# Patient Record
Sex: FEMALE | Race: WHITE | NOT HISPANIC OR LATINO | URBAN - METROPOLITAN AREA
[De-identification: names, ages, dates, MRNs, and addresses within clinical notes are randomized per-mention and may not be internally consistent; named-entity substitution may affect disease eponyms.]

---

## 2020-10-14 ENCOUNTER — OFFICE VISIT (OUTPATIENT)
Dept: URBAN - METROPOLITAN AREA CLINIC 12 | Facility: CLINIC | Age: 60
End: 2020-10-14

## 2020-10-30 ENCOUNTER — TELEPHONE ENCOUNTER (OUTPATIENT)
Dept: URBAN - METROPOLITAN AREA CLINIC 23 | Facility: CLINIC | Age: 60
End: 2020-10-30

## 2020-11-11 ENCOUNTER — OFFICE VISIT (OUTPATIENT)
Dept: URBAN - METROPOLITAN AREA CLINIC 12 | Facility: CLINIC | Age: 60
End: 2020-11-11
Payer: COMMERCIAL

## 2020-11-11 DIAGNOSIS — K57.92 DIVERTICULITIS: ICD-10-CM

## 2020-11-11 DIAGNOSIS — A04.72 CLOSTRIDIOIDES DIFFICILE DIARRHEA: ICD-10-CM

## 2020-11-11 DIAGNOSIS — K52.9 COLITIS: ICD-10-CM

## 2020-11-11 DIAGNOSIS — Z83.71 FAMILY HISTORY OF POLYPS IN THE COLON: ICD-10-CM

## 2020-11-11 PROBLEM — 64226004 COLITIS: Status: ACTIVE | Noted: 2020-11-11

## 2020-11-11 PROBLEM — 429969003 FAMILY HISTORY OF POLYP OF COLON: Status: ACTIVE | Noted: 2020-11-11

## 2020-11-11 PROCEDURE — 99243 OFF/OP CNSLTJ NEW/EST LOW 30: CPT | Performed by: INTERNAL MEDICINE

## 2020-11-11 PROCEDURE — 99203 OFFICE O/P NEW LOW 30 MIN: CPT | Performed by: INTERNAL MEDICINE

## 2020-11-11 PROCEDURE — G8482 FLU IMMUNIZE ORDER/ADMIN: HCPCS | Performed by: INTERNAL MEDICINE

## 2020-11-11 NOTE — HPI-TODAY'S VISIT:
59 yo female presenting to [facility] care hx of c diff, had colonoscopy in 2017 with ulcerations which raised quetion of crohns but found to have c diff and per Dr. Byrd's notations biopsies showed focal active colitis, thought to be secondary to the c diff.  no recommendation for f/u given.  had hp polyp.  had egd at that time as well -she was in [place] for her daughter's delivery.  she had been having lower abdominal discomfort for a few weeks, ultimately went to er and had ct. told she had diverticulitis and was given abx for 10 days.  she was having loose stools on presentation , was tested for c diff at the hospital.  no blood in the stool.  this cleared up with the abx as well.  -had an episode of severe heartburn a few weeks ago, cleared up with prilosec.  -diet back to normal, good appetite, weight stable. no pain, no bloating. no other complaints now  -she had her flu shot

## 2021-11-17 ENCOUNTER — OFFICE VISIT (OUTPATIENT)
Dept: URBAN - METROPOLITAN AREA CLINIC 78 | Facility: CLINIC | Age: 61
End: 2021-11-17

## 2021-12-22 ENCOUNTER — OFFICE VISIT (OUTPATIENT)
Dept: URBAN - METROPOLITAN AREA CLINIC 78 | Facility: CLINIC | Age: 61
End: 2021-12-22

## 2022-07-01 ENCOUNTER — OFFICE VISIT (OUTPATIENT)
Dept: URBAN - METROPOLITAN AREA CLINIC 78 | Facility: CLINIC | Age: 62
End: 2022-07-01

## 2022-07-18 ENCOUNTER — OFFICE VISIT (OUTPATIENT)
Dept: URBAN - METROPOLITAN AREA CLINIC 78 | Facility: CLINIC | Age: 62
End: 2022-07-18
Payer: COMMERCIAL

## 2022-07-18 VITALS
BODY MASS INDEX: 24.56 KG/M2 | WEIGHT: 138.6 LBS | RESPIRATION RATE: 16 BRPM | DIASTOLIC BLOOD PRESSURE: 75 MMHG | HEIGHT: 63 IN | SYSTOLIC BLOOD PRESSURE: 118 MMHG | HEART RATE: 68 BPM | TEMPERATURE: 98.6 F

## 2022-07-18 DIAGNOSIS — Z79.02 ANTIPLATELET OR ANTITHROMBOTIC LONG-TERM USE: ICD-10-CM

## 2022-07-18 DIAGNOSIS — Z86.010 PERSONAL HISTORY OF COLONIC POLYPS: ICD-10-CM

## 2022-07-18 DIAGNOSIS — K57.90 DIVERTICULOSIS: ICD-10-CM

## 2022-07-18 PROBLEM — 397881000: Status: ACTIVE | Noted: 2022-07-18

## 2022-07-18 PROCEDURE — 99204 OFFICE O/P NEW MOD 45 MIN: CPT | Performed by: INTERNAL MEDICINE

## 2022-07-18 RX ORDER — ROSUVASTATIN CALCIUM 10 MG/1
1 TABLET TABLET, FILM COATED ORAL ONCE A DAY
Status: ACTIVE | COMMUNITY

## 2022-07-18 RX ORDER — ASPIRIN 81 MG/1
1 TABLET TABLET, COATED ORAL ONCE A DAY
Status: ACTIVE | COMMUNITY

## 2022-07-18 RX ORDER — SODIUM SULFATE, MAGNESIUM SULFATE, AND POTASSIUM CHLORIDE 17.75; 2.7; 2.25 G/1; G/1; G/1
12 TABLETS TABLET ORAL
Qty: 24 TABLETS | Refills: 0 | OUTPATIENT
Start: 2022-07-18 | End: 2022-07-19

## 2022-07-18 RX ORDER — LEVOTHYROXINE SODIUM 50 UG/1
1 TABLET IN THE MORNING ON AN EMPTY STOMACH TABLET ORAL
Status: ACTIVE | COMMUNITY

## 2022-07-18 RX ORDER — CLOPIDOGREL BISULFATE 75 MG/1
1 TABLET TABLET ORAL ONCE A DAY
Status: ACTIVE | COMMUNITY

## 2022-07-18 RX ORDER — LEVOTHYROXINE SODIUM 75 UG/1
1 TABLET IN THE MORNING ON AN EMPTY STOMACH TABLET ORAL
Status: ACTIVE | COMMUNITY

## 2022-07-18 NOTE — HPI-TODAY'S VISIT:
Patient was referred by Dr. Fritz Vidales  A copy of this document will be sent to the physician.   The patient presents for a colon cancer screening. There is no family history of of cancer. Patient denies change in bowel habits, appetite, and weight. Patient denies bleeding per rectum. Last colonoscopy: 2018 ( Bx chronic active colitis , patient denies IBD )   Sister had colon polyps  Personal of history of benign colon polyps in past  Personal pf history of C. diff  Personal of  appendectomy   Personal history of reflux  well controlled  Deneis dysphagia  ALso has personal hx of thyroid issues /goitre EGD biopsy from 2018 : NO Barretts esophagus   Personal history Fibromuscular hyperplasia of Right yola artery Dr Chapito Cook. It caused her to have Grey out  Patient also has seen cardiologist including FABIANA   Currently on Daryl ASA 81 mg a day   She is also Clopidogrel for samel      Denies chest pain  Denies SOB  Denies easy bruising  Denies anesthesia complication

## 2022-09-20 ENCOUNTER — CLAIMS CREATED FROM THE CLAIM WINDOW (OUTPATIENT)
Dept: URBAN - METROPOLITAN AREA SURGERY CENTER 15 | Facility: SURGERY CENTER | Age: 62
End: 2022-09-20
Payer: COMMERCIAL

## 2022-09-20 ENCOUNTER — OFFICE VISIT (OUTPATIENT)
Dept: URBAN - METROPOLITAN AREA SURGERY CENTER 15 | Facility: SURGERY CENTER | Age: 62
End: 2022-09-20

## 2022-09-20 ENCOUNTER — CLAIMS CREATED FROM THE CLAIM WINDOW (OUTPATIENT)
Dept: URBAN - METROPOLITAN AREA CLINIC 4 | Facility: CLINIC | Age: 62
End: 2022-09-20
Payer: COMMERCIAL

## 2022-09-20 DIAGNOSIS — D12.5 BENIGN NEOPLASM OF SIGMOID COLON: ICD-10-CM

## 2022-09-20 DIAGNOSIS — K63.89 OTHER SPECIFIED DISEASES OF INTESTINE: ICD-10-CM

## 2022-09-20 DIAGNOSIS — Z86.010 ADENOMAS PERSONAL HISTORY OF COLONIC POLYPS: ICD-10-CM

## 2022-09-20 DIAGNOSIS — D12.5 ADENOMA OF SIGMOID COLON: ICD-10-CM

## 2022-09-20 DIAGNOSIS — K62.1 ANAL AND RECTAL POLYP: ICD-10-CM

## 2022-09-20 PROCEDURE — G8907 PT DOC NO EVENTS ON DISCHARG: HCPCS | Performed by: INTERNAL MEDICINE

## 2022-09-20 PROCEDURE — 88305 TISSUE EXAM BY PATHOLOGIST: CPT | Performed by: PATHOLOGY

## 2022-09-20 PROCEDURE — 45385 COLONOSCOPY W/LESION REMOVAL: CPT | Performed by: INTERNAL MEDICINE

## 2022-09-20 RX ORDER — LEVOTHYROXINE SODIUM 75 UG/1
1 TABLET IN THE MORNING ON AN EMPTY STOMACH TABLET ORAL
Status: ACTIVE | COMMUNITY

## 2022-09-20 RX ORDER — CLOPIDOGREL BISULFATE 75 MG/1
1 TABLET TABLET ORAL ONCE A DAY
Status: ACTIVE | COMMUNITY

## 2022-09-20 RX ORDER — LEVOTHYROXINE SODIUM 50 UG/1
1 TABLET IN THE MORNING ON AN EMPTY STOMACH TABLET ORAL
Status: ACTIVE | COMMUNITY

## 2022-09-20 RX ORDER — ASPIRIN 81 MG/1
1 TABLET TABLET, COATED ORAL ONCE A DAY
Status: ACTIVE | COMMUNITY

## 2022-09-20 RX ORDER — ROSUVASTATIN CALCIUM 10 MG/1
1 TABLET TABLET, FILM COATED ORAL ONCE A DAY
Status: ACTIVE | COMMUNITY

## 2023-07-03 ENCOUNTER — LAB OUTSIDE AN ENCOUNTER (OUTPATIENT)
Dept: URBAN - METROPOLITAN AREA CLINIC 111 | Facility: CLINIC | Age: 63
End: 2023-07-03

## 2023-07-03 ENCOUNTER — OFFICE VISIT (OUTPATIENT)
Dept: URBAN - METROPOLITAN AREA CLINIC 111 | Facility: CLINIC | Age: 63
End: 2023-07-03
Payer: COMMERCIAL

## 2023-07-03 VITALS
WEIGHT: 154 LBS | BODY MASS INDEX: 27.29 KG/M2 | DIASTOLIC BLOOD PRESSURE: 80 MMHG | HEART RATE: 59 BPM | SYSTOLIC BLOOD PRESSURE: 127 MMHG | HEIGHT: 63 IN | TEMPERATURE: 97.7 F

## 2023-07-03 DIAGNOSIS — R19.4 CHANGE IN BOWEL HABITS: ICD-10-CM

## 2023-07-03 DIAGNOSIS — Z86.010 HISTORY OF ADENOMATOUS POLYP OF COLON: ICD-10-CM

## 2023-07-03 DIAGNOSIS — K59.09 CHANGE IN BOWEL MOVEMENTS INTERMITTENT CONSTIPATION. URGENCY IN THE MORNING.: ICD-10-CM

## 2023-07-03 DIAGNOSIS — R10.13 DYSPEPSIA: ICD-10-CM

## 2023-07-03 DIAGNOSIS — R12 HEARTBURN: ICD-10-CM

## 2023-07-03 DIAGNOSIS — R10.84 GENERALIZED ABDOMINAL PAIN: ICD-10-CM

## 2023-07-03 DIAGNOSIS — Z79.02 LONG TERM (CURRENT) USE OF ANTITHROMBOTICS/ANTIPLATELETS: ICD-10-CM

## 2023-07-03 DIAGNOSIS — K59.00 CONSTIPATION: ICD-10-CM

## 2023-07-03 DIAGNOSIS — K57.32 CECAL DIVERTICULITIS: ICD-10-CM

## 2023-07-03 PROCEDURE — 99214 OFFICE O/P EST MOD 30 MIN: CPT | Performed by: INTERNAL MEDICINE

## 2023-07-03 RX ORDER — CLOPIDOGREL BISULFATE 75 MG/1
1 TABLET TABLET ORAL ONCE A DAY
Status: ACTIVE | COMMUNITY

## 2023-07-03 RX ORDER — ROSUVASTATIN CALCIUM 10 MG/1
1 TABLET TABLET, FILM COATED ORAL ONCE A DAY
Status: ACTIVE | COMMUNITY

## 2023-07-03 RX ORDER — LEVOTHYROXINE SODIUM 50 UG/1
1 TABLET IN THE MORNING ON AN EMPTY STOMACH TABLET ORAL
Status: ACTIVE | COMMUNITY

## 2023-07-03 RX ORDER — ASPIRIN 81 MG/1
1 TABLET TABLET, COATED ORAL ONCE A DAY
Status: ACTIVE | COMMUNITY

## 2023-07-03 RX ORDER — LEVOTHYROXINE SODIUM 75 UG/1
1 TABLET IN THE MORNING ON AN EMPTY STOMACH TABLET ORAL
Status: ACTIVE | COMMUNITY

## 2023-07-03 NOTE — HPI-TODAY'S VISIT:
- 62 yo  female who returns for follow-up; patient was last seen in our GI practice by Dr. Graciela Cross in September 2022 - Was treated for recurrent diverticulitis by her PCP last month, starting on June 21.  She has had more than 3 lifetime episodes of diverticulitis. - She feels better since the above, but she notes new constipation, abdominal bloating, and occasional diffuse abdominal cramping  - Needs Prilosec 2-week courses 4 times a year - I have discussed with the patient the findings of previous endoscopic procedures and pathology results.  All questions were answered to their satisfaction.

## 2023-08-07 ENCOUNTER — OFFICE VISIT (OUTPATIENT)
Dept: URBAN - METROPOLITAN AREA CLINIC 78 | Facility: CLINIC | Age: 63
End: 2023-08-07

## 2023-09-07 ENCOUNTER — LAB OUTSIDE AN ENCOUNTER (OUTPATIENT)
Dept: URBAN - METROPOLITAN AREA CLINIC 23 | Facility: CLINIC | Age: 63
End: 2023-09-07

## 2023-09-07 LAB
CREATININE POC: 0.6
PERFORMING LAB: (no result)

## 2023-09-20 ENCOUNTER — TELEPHONE ENCOUNTER (OUTPATIENT)
Dept: URBAN - METROPOLITAN AREA CLINIC 23 | Facility: CLINIC | Age: 63
End: 2023-09-20

## 2023-09-20 ENCOUNTER — OFFICE VISIT (OUTPATIENT)
Dept: URBAN - METROPOLITAN AREA LAB 3 | Facility: LAB | Age: 63
End: 2023-09-20
Payer: COMMERCIAL

## 2023-09-20 DIAGNOSIS — D12.4 ADENOMA OF DESCENDING COLON: ICD-10-CM

## 2023-09-20 DIAGNOSIS — K63.5 BENIGN COLON POLYP: ICD-10-CM

## 2023-09-20 DIAGNOSIS — K59.09 COLONIC CONSTIPATION: ICD-10-CM

## 2023-09-20 DIAGNOSIS — K21.00 ALKALINE REFLUX ESOPHAGITIS: ICD-10-CM

## 2023-09-20 DIAGNOSIS — K29.60 ADENOPAPILLOMATOSIS GASTRICA: ICD-10-CM

## 2023-09-20 DIAGNOSIS — K62.1 ANAL AND RECTAL POLYP: ICD-10-CM

## 2023-09-20 DIAGNOSIS — Z86.010 ADENOMAS PERSONAL HISTORY OF COLONIC POLYPS: ICD-10-CM

## 2023-09-20 DIAGNOSIS — K31.7 BENIGN GASTRIC POLYP: ICD-10-CM

## 2023-09-20 PROCEDURE — 45384 COLONOSCOPY W/LESION REMOVAL: CPT | Performed by: INTERNAL MEDICINE

## 2023-09-20 PROCEDURE — 45380 COLONOSCOPY AND BIOPSY: CPT | Performed by: INTERNAL MEDICINE

## 2023-09-20 PROCEDURE — 43239 EGD BIOPSY SINGLE/MULTIPLE: CPT | Performed by: INTERNAL MEDICINE

## 2023-09-20 PROCEDURE — 45385 COLONOSCOPY W/LESION REMOVAL: CPT | Performed by: INTERNAL MEDICINE

## 2023-09-20 RX ORDER — CLOPIDOGREL BISULFATE 75 MG/1
1 TABLET TABLET ORAL ONCE A DAY
Status: ACTIVE | COMMUNITY

## 2023-09-20 RX ORDER — ROSUVASTATIN CALCIUM 10 MG/1
1 TABLET TABLET, FILM COATED ORAL ONCE A DAY
Status: ACTIVE | COMMUNITY

## 2023-09-20 RX ORDER — LEVOTHYROXINE SODIUM 50 UG/1
1 TABLET IN THE MORNING ON AN EMPTY STOMACH TABLET ORAL
Status: ACTIVE | COMMUNITY

## 2023-09-20 RX ORDER — LEVOTHYROXINE SODIUM 75 UG/1
1 TABLET IN THE MORNING ON AN EMPTY STOMACH TABLET ORAL
Status: ACTIVE | COMMUNITY

## 2023-09-20 RX ORDER — ASPIRIN 81 MG/1
1 TABLET TABLET, COATED ORAL ONCE A DAY
Status: ACTIVE | COMMUNITY

## 2023-10-03 ENCOUNTER — TELEPHONE ENCOUNTER (OUTPATIENT)
Dept: URBAN - METROPOLITAN AREA CLINIC 111 | Facility: CLINIC | Age: 63
End: 2023-10-03

## 2023-12-06 ENCOUNTER — OFFICE VISIT (OUTPATIENT)
Dept: URBAN - METROPOLITAN AREA CLINIC 111 | Facility: CLINIC | Age: 63
End: 2023-12-06

## 2024-04-23 ENCOUNTER — OV EP (OUTPATIENT)
Dept: URBAN - METROPOLITAN AREA CLINIC 111 | Facility: CLINIC | Age: 64
End: 2024-04-23

## 2024-04-23 RX ORDER — CLOPIDOGREL BISULFATE 75 MG/1
1 TABLET TABLET ORAL ONCE A DAY
Status: ACTIVE | COMMUNITY

## 2024-04-23 RX ORDER — ASPIRIN 81 MG/1
1 TABLET TABLET, COATED ORAL ONCE A DAY
Status: ACTIVE | COMMUNITY

## 2024-04-23 RX ORDER — LEVOTHYROXINE SODIUM 50 UG/1
1 TABLET IN THE MORNING ON AN EMPTY STOMACH TABLET ORAL
Status: ACTIVE | COMMUNITY

## 2024-04-23 RX ORDER — LEVOTHYROXINE SODIUM 75 UG/1
1 TABLET IN THE MORNING ON AN EMPTY STOMACH TABLET ORAL
Status: ACTIVE | COMMUNITY

## 2024-04-23 RX ORDER — ROSUVASTATIN CALCIUM 10 MG/1
1 TABLET TABLET, FILM COATED ORAL ONCE A DAY
Status: ACTIVE | COMMUNITY

## 2024-04-24 ENCOUNTER — OV EP (OUTPATIENT)
Dept: URBAN - METROPOLITAN AREA CLINIC 111 | Facility: CLINIC | Age: 64
End: 2024-04-24

## 2024-04-24 VITALS
WEIGHT: 161 LBS | BODY MASS INDEX: 28.53 KG/M2 | DIASTOLIC BLOOD PRESSURE: 80 MMHG | HEIGHT: 63 IN | HEART RATE: 80 BPM | TEMPERATURE: 97.7 F | SYSTOLIC BLOOD PRESSURE: 122 MMHG

## 2024-04-24 RX ORDER — LEVOTHYROXINE SODIUM 50 UG/1
1 TABLET IN THE MORNING ON AN EMPTY STOMACH TABLET ORAL
Status: ACTIVE | COMMUNITY

## 2024-04-24 RX ORDER — LEVOTHYROXINE SODIUM 75 UG/1
1 TABLET IN THE MORNING ON AN EMPTY STOMACH TABLET ORAL
Status: ACTIVE | COMMUNITY

## 2024-04-24 RX ORDER — CLOPIDOGREL BISULFATE 75 MG/1
1 TABLET TABLET ORAL ONCE A DAY
Status: ACTIVE | COMMUNITY

## 2024-04-24 RX ORDER — ROSUVASTATIN CALCIUM 10 MG/1
1 TABLET TABLET, FILM COATED ORAL ONCE A DAY
Status: ACTIVE | COMMUNITY

## 2024-04-24 RX ORDER — ASPIRIN 81 MG/1
1 TABLET TABLET, COATED ORAL ONCE A DAY
Status: ACTIVE | COMMUNITY

## 2024-05-09 ENCOUNTER — DASHBOARD ENCOUNTERS (OUTPATIENT)
Age: 64
End: 2024-05-09

## 2024-06-12 ENCOUNTER — OFFICE VISIT (OUTPATIENT)
Dept: URBAN - METROPOLITAN AREA CLINIC 111 | Facility: CLINIC | Age: 64
End: 2024-06-12

## 2025-05-01 ENCOUNTER — WEB ENCOUNTER (OUTPATIENT)
Dept: URBAN - METROPOLITAN AREA CLINIC 111 | Facility: CLINIC | Age: 65
End: 2025-05-01

## 2025-05-05 ENCOUNTER — WEB ENCOUNTER (OUTPATIENT)
Dept: URBAN - METROPOLITAN AREA CLINIC 111 | Facility: CLINIC | Age: 65
End: 2025-05-05

## 2025-05-08 ENCOUNTER — OFFICE VISIT (OUTPATIENT)
Dept: URBAN - METROPOLITAN AREA CLINIC 12 | Facility: CLINIC | Age: 65
End: 2025-05-08
Payer: COMMERCIAL

## 2025-05-08 ENCOUNTER — OFFICE VISIT (OUTPATIENT)
Dept: URBAN - METROPOLITAN AREA CLINIC 111 | Facility: CLINIC | Age: 65
End: 2025-05-08

## 2025-05-08 DIAGNOSIS — K21.9 CHRONIC GERD: ICD-10-CM

## 2025-05-08 DIAGNOSIS — K44.9 HIATAL HERNIA: ICD-10-CM

## 2025-05-08 DIAGNOSIS — K64.8 INTERNAL HEMORRHOIDS: ICD-10-CM

## 2025-05-08 DIAGNOSIS — K57.92 RECURRENT DIVERTICULITIS: ICD-10-CM

## 2025-05-08 PROCEDURE — 99213 OFFICE O/P EST LOW 20 MIN: CPT | Performed by: INTERNAL MEDICINE

## 2025-05-08 RX ORDER — ASPIRIN 81 MG/1
1 TABLET TABLET, COATED ORAL ONCE A DAY
Status: ACTIVE | COMMUNITY

## 2025-05-08 RX ORDER — ROSUVASTATIN 10 MG/1
1 TABLET TABLET, FILM COATED ORAL ONCE A DAY
Status: ACTIVE | COMMUNITY

## 2025-05-08 RX ORDER — LEVOTHYROXINE SODIUM 50 UG/1
1 TABLET IN THE MORNING ON AN EMPTY STOMACH TABLET ORAL
Status: ACTIVE | COMMUNITY

## 2025-05-08 RX ORDER — CLOPIDOGREL BISULFATE 75 MG/1
1 TABLET TABLET ORAL ONCE A DAY
Status: ACTIVE | COMMUNITY

## 2025-05-08 RX ORDER — LEVOTHYROXINE SODIUM 75 UG/1
1 TABLET IN THE MORNING ON AN EMPTY STOMACH TABLET ORAL
Status: ACTIVE | COMMUNITY

## 2025-05-08 NOTE — HPI-TODAY'S VISIT:
- 66 yo  female who is here for  Refill request for acid reflux medication (pantoprazole  20mg) discussed long-term risk of PPI use - Reports doing well on current medication - Following weight loss program to address elevated weight (165 lbs) - Taking medication once daily in morning with good response - Reports external hemorrhoids with seepage when wearing weighted vest for exercise - Increased pressure in rectal area during exercise with weighted vest  - Small hiatal hernia noted on previous endoscopy - No Mix's esophagus - No major esophageal damage - Currently on Plavix     - Patient has chronic GERD with a small hiatal hernia and prior history of LA grade A reflux esophagitis, without prior evidence of Mix's esophagus    - She is doing well on pantoprazole 20 mg once a day, with good control of acid reflux symptoms   - For her history of recurrent diverticulitis,   - Patient has a history of adenomatous colon polyps and left-sided colonic diverticulosis.   last colonoscopy in 2023 and I recommended a surveillance colonoscopy in 3 years.

## 2025-06-11 ENCOUNTER — OFFICE VISIT (OUTPATIENT)
Dept: URBAN - METROPOLITAN AREA CLINIC 111 | Facility: CLINIC | Age: 65
End: 2025-06-11